# Patient Record
Sex: MALE | Race: WHITE | NOT HISPANIC OR LATINO | Employment: FULL TIME | URBAN - METROPOLITAN AREA
[De-identification: names, ages, dates, MRNs, and addresses within clinical notes are randomized per-mention and may not be internally consistent; named-entity substitution may affect disease eponyms.]

---

## 2017-10-05 ENCOUNTER — OFFICE VISIT (OUTPATIENT)
Dept: MEDICAL GROUP | Facility: MEDICAL CENTER | Age: 57
End: 2017-10-05
Payer: COMMERCIAL

## 2017-10-05 VITALS
TEMPERATURE: 98.3 F | WEIGHT: 218 LBS | HEIGHT: 71 IN | HEART RATE: 95 BPM | SYSTOLIC BLOOD PRESSURE: 120 MMHG | DIASTOLIC BLOOD PRESSURE: 88 MMHG | OXYGEN SATURATION: 95 % | BODY MASS INDEX: 30.52 KG/M2 | RESPIRATION RATE: 16 BRPM

## 2017-10-05 DIAGNOSIS — Z23 NEED FOR IMMUNIZATION AGAINST INFLUENZA: ICD-10-CM

## 2017-10-05 DIAGNOSIS — J30.1 CHRONIC SEASONAL ALLERGIC RHINITIS DUE TO POLLEN: ICD-10-CM

## 2017-10-05 DIAGNOSIS — E78.00 PURE HYPERCHOLESTEROLEMIA: ICD-10-CM

## 2017-10-05 DIAGNOSIS — R73.01 ELEVATED FASTING BLOOD SUGAR: ICD-10-CM

## 2017-10-05 DIAGNOSIS — K64.9 HEMORRHOIDS, UNSPECIFIED HEMORRHOID TYPE: ICD-10-CM

## 2017-10-05 DIAGNOSIS — Z00.00 ANNUAL PHYSICAL EXAM: ICD-10-CM

## 2017-10-05 DIAGNOSIS — E66.9 OBESITY (BMI 30-39.9): ICD-10-CM

## 2017-10-05 DIAGNOSIS — F41.8 SITUATIONAL ANXIETY: ICD-10-CM

## 2017-10-05 DIAGNOSIS — D69.6 THROMBOCYTOPENIA (HCC): ICD-10-CM

## 2017-10-05 PROCEDURE — 90686 IIV4 VACC NO PRSV 0.5 ML IM: CPT | Performed by: FAMILY MEDICINE

## 2017-10-05 PROCEDURE — 99999 PR NO CHARGE: CPT | Performed by: FAMILY MEDICINE

## 2017-10-05 PROCEDURE — 99386 PREV VISIT NEW AGE 40-64: CPT | Mod: 25 | Performed by: FAMILY MEDICINE

## 2017-10-05 PROCEDURE — 90471 IMMUNIZATION ADMIN: CPT | Performed by: FAMILY MEDICINE

## 2017-10-05 RX ORDER — LORAZEPAM 0.5 MG/1
TABLET ORAL
Qty: 20 TAB | Refills: 1 | Status: SHIPPED
Start: 2017-10-05 | End: 2018-04-14 | Stop reason: SDUPTHER

## 2017-10-05 RX ORDER — IBUPROFEN 200 MG
200 TABLET ORAL EVERY 6 HOURS PRN
COMMUNITY
End: 2018-05-10

## 2017-10-05 ASSESSMENT — PATIENT HEALTH QUESTIONNAIRE - PHQ9: CLINICAL INTERPRETATION OF PHQ2 SCORE: 0

## 2017-10-05 NOTE — LETTER
[facility]  Jose Maria Otero M.D.  [address]  [place] [address]  Fax: [phone]   Authorization for Release/Disclosure of   Protected Health Information   Name: MARVIN PLEITEZ : 1960 SSN: [id number]   Address: [address] [place] [address]  [place] [address] Phone:    [phone] (home) [phone] (work)   I authorize the entity listed below to release/disclose the PHI below to:   [facility]/Jose Maria Otero M.D. and Jose Maria Otero M.D.   Provider or Entity Name:  [facility], [facility]  [address] [facility] Phone:  [phone]    Fax:     Reason for request: continuity of care   Information to be released:    [  ] LAST COLONOSCOPY,  including any PATH REPORT and follow-up  [  ] LAST FIT/COLOGUARD RESULT [  ] LAST DEXA  [  ] LAST MAMMOGRAM  [  ] LAST PAP  [  ] LAST LABS [  ] RETINA EXAM REPORT  [  ] IMMUNIZATION RECORDS  [  ] Release all info      [  ] Check here and initial the line next to each item to release ALL health information INCLUDING  _____ Care and treatment for drug and / or alcohol abuse  _____ HIV testing, infection status, or AIDS  _____ Genetic Testing    DATES OF SERVICE OR TIME PERIOD TO BE DISCLOSED: _____________  I understand and acknowledge that:  * This Authorization may be revoked at any time by you in writing, except if your health information has already been used or disclosed.  * Your health information that will be used or disclosed as a result of you signing this authorization could be re-disclosed by the recipient. If this occurs, your re-disclosed health information may no longer be protected by State or Federal laws.  * You may refuse to sign this Authorization. Your refusal will not affect your ability to obtain treatment.  * This Authorization becomes effective upon signing and will  on (date) __________.      If no date is indicated, this Authorization will  one (1) year from the signature date.    Name:  Edith Love    Signature:   Date:     10/5/2017       PLEASE FAX REQUESTED RECORDS BACK TO: [phone]

## 2017-10-06 NOTE — PROGRESS NOTES
"Edith Love is a 57 y.o. male here for annual  HPI: 57-year-old white male who works as an  at the [employer]. His son is a medical student in [place].    He has no serious complaints other than he occasionally suffers with hemorrhoids that he treats with over-the-counter creams. In about once every 2 months he uses a lorazepam 0.5 mg tablet before a big speech or presentation. He states that he does not use a lot of perspiration and nervousness during the talk. His last prescription of lorazepam 20 tablets was over 3 years ago.    Current medicines (including changes today)  Current Outpatient Prescriptions   Medication Sig Dispense Refill   • ibuprofen (ADVIL) 200 MG Tab Take 200 mg by mouth every 6 hours as needed.     • lorazepam (ATIVAN) 0.5 MG Tab Take 30-60 before meeting one daily prn 20 Tab 1     No current facility-administered medications for this visit.      He  has no past medical history on file.  He  has no past surgical history on file.  Social History   Substance Use Topics   • Smoking status: Former Smoker     Years: 10.00   • Smokeless tobacco: Never Used      Comment: quit smoking in ; about 10 cigs daily    • Alcohol use Not on file     Social History     Social History Narrative   • No narrative on file     Family History   Problem Relation Age of Onset   • Stroke Father      Family Status   Relation Status   • Father    • Mother Alive   • Sister Alive   • Daughter Alive   • Son Alive    in [place]          ROS  No chest pain  All other systems reviewed and are negative     Objective:     Blood pressure 120/88, pulse 95, temperature 36.8 °C (98.3 °F), resp. rate 16, height 1.803 m (5' 10.98\"), weight 98.9 kg (218 lb), SpO2 95 %. Body mass index is 30.42 kg/m².  Physical Exam:    Constitutional: Alert, no distress.  Skin: Warm, dry, good turgor, no rashes in visible areas.  Eye: Equal, round and reactive, conjunctiva clear, lids normal.  ENMT: Lips without lesions, good " dentition, oropharynx clear.  Neck: Trachea midline, no masses, no thyromegaly. No cervical or supraclavicular lymphadenopathy.  Respiratory: Unlabored respiratory effort, lungs clear to auscultation, no wheezes, no ronchi.  Cardiovascular: Normal S1, S2, no murmur, no edema.  Abdomen: Soft, non-tender, no masses, no hepatosplenomegaly.  Psych: Alert and oriented x3, normal affect and mood.        Assessment and Plan:   The following treatment plan was discussed    1. Annual physical exam  Advised diet and exercise. Check labs and call with results.  - PROSTATE SPECIFIC AG SCREENING; Future  - COMP METABOLIC PANEL; Future  - CBC WITH DIFFERENTIAL; Future  - LIPID PROFILE; Future  - TSH WITH REFLEX TO FT4; Future  - HEMOGLOBIN A1C; Future    2. Need for immunization against influenza  - INFLUENZA VACCINE QUAD INJ >3Y(PF)    3. Obesity (BMI 30-39.9)  - Patient identified as having weight management issue.  Appropriate orders and counseling given.    4. Pure hypercholesterolemia  Advised diet and exercise. Check labs and call with results.    5. Elevated fasting blood sugar  Advised diet and exercise. Check labs and call with results.    6. Thrombocytopenia (CMS-HCC)  Check labs and call with results.    7. Chronic seasonal allergic rhinitis due to pollen  Stable. Continue to monitor.    8. Situational anxiety  Refill given today for lorazepam.  - lorazepam (ATIVAN) 0.5 MG Tab; Take 30-60 before meeting one daily prn  Dispense: 20 Tab; Refill: 1    9. Hemorrhoids, unspecified hemorrhoid type  Advised patient and if he is not able to control symptoms, we should refer him to gastroenterology.      Records requested from [facility] for colonoscopy records.  Followup: Return in about 1 year (around 10/5/2018) for Annual, Short.

## 2017-11-30 ENCOUNTER — OFFICE VISIT (OUTPATIENT)
Dept: URGENT CARE | Facility: CLINIC | Age: 57
End: 2017-11-30
Payer: COMMERCIAL

## 2017-11-30 VITALS
OXYGEN SATURATION: 95 % | DIASTOLIC BLOOD PRESSURE: 80 MMHG | HEIGHT: 71 IN | HEART RATE: 92 BPM | RESPIRATION RATE: 20 BRPM | WEIGHT: 215 LBS | SYSTOLIC BLOOD PRESSURE: 120 MMHG | BODY MASS INDEX: 30.1 KG/M2 | TEMPERATURE: 98.3 F

## 2017-11-30 DIAGNOSIS — J10.1 INFLUENZA A: ICD-10-CM

## 2017-11-30 LAB
FLUAV+FLUBV AG SPEC QL IA: NORMAL
INT CON NEG: NORMAL
INT CON POS: NORMAL

## 2017-11-30 PROCEDURE — 99213 OFFICE O/P EST LOW 20 MIN: CPT | Performed by: FAMILY MEDICINE

## 2017-11-30 PROCEDURE — 87804 INFLUENZA ASSAY W/OPTIC: CPT | Performed by: FAMILY MEDICINE

## 2017-11-30 RX ORDER — OSELTAMIVIR PHOSPHATE 75 MG/1
75 CAPSULE ORAL 2 TIMES DAILY
Qty: 10 CAP | Refills: 0 | Status: SHIPPED | OUTPATIENT
Start: 2017-11-30 | End: 2017-12-05

## 2017-11-30 NOTE — PROGRESS NOTES
"HPI: Edith Love is a 57 y.o. male who presents with   Chief Complaint   Patient presents with   • Cough     Couple wks dry cough , stuffy nose , couple days fever and headache       Patient has had several weeks of a dry cough and nasal congestion but over hte past 24 hours he's had bodyaches fevers chills increased F he knows. He did receive a flu vaccination approximately 6 weeks ago. He is a professor. Denies any nausea vomiting or diarrhea.  Worsened by: activity, laying supine at night, first thing in the morning, when exposed to outside allergens  Improved by: OTC symptomatic medictions      PMH:  has no past medical history on file.  MEDS:   Current Outpatient Prescriptions:   •  ibuprofen (ADVIL) 200 MG Tab, Take 200 mg by mouth every 6 hours as needed., Disp: , Rfl:   •  lorazepam (ATIVAN) 0.5 MG Tab, Take 30-60 before meeting one daily prn, Disp: 20 Tab, Rfl: 1  ALLERGIES:   Allergies   Allergen Reactions   • Nkda [No Known Drug Allergy]    SURGHX: No past surgical history on file.  SOCHX:  reports that he has quit smoking. He quit after 10.00 years of use. He has never used smokeless tobacco. He reports that he does not use drugs.  FH: Family history was reviewed, no pertinent findings to report    PE:  Vitals /80   Pulse 92   Temp 36.8 °C (98.3 °F)   Resp 20   Ht 1.803 m (5' 11\")   Wt 97.5 kg (215 lb)   SpO2 95%   BMI 29.99 kg/m²    Gen AOx4, NAD  HEENT: moist mucus membranes,Nasal congestion is present no pain or pressure with percussion of frontal, maxillary or ethmoid sinuses.  Bilateral conjunciva clear without erythema or exudate,  Bilateral TM's clear without bulge, fluid or loss of landmarks, no pharyngeal erythema or tonsillar exudate or tonsillar enlargement  Neck: supple, no cervical lymphadenopathy, no signs of menigismus  CV/PULM: RRR no murmurs, no rales ronchi or wheezes, no signs of resp distress  Abd soft nontender, bs present  Skin no rashes  Extremities -c/c/e  Neuro " appropriate affect,     A/P  1. Influenza A  POCT Influenza A/B    Hydrocod Polst-CPM Polst ER (TUSSIONEX) 10-8 MG/5ML Suspension Extended Release    oseltamivir (TAMIFLU) 75 MG Cap     Follow-up with primary care provider within 4-5 days, emergency room precautions discussed.  Patient and/or family appears understanding of information.

## 2018-02-20 ENCOUNTER — APPOINTMENT (OUTPATIENT)
Dept: RADIOLOGY | Facility: MEDICAL CENTER | Age: 58
End: 2018-02-20
Attending: NURSE PRACTITIONER
Payer: COMMERCIAL

## 2018-02-20 ENCOUNTER — TELEPHONE (OUTPATIENT)
Dept: MEDICAL GROUP | Facility: MEDICAL CENTER | Age: 58
End: 2018-02-20

## 2018-02-20 ENCOUNTER — APPOINTMENT (OUTPATIENT)
Dept: RADIOLOGY | Facility: IMAGING CENTER | Age: 58
End: 2018-02-20
Attending: NURSE PRACTITIONER
Payer: COMMERCIAL

## 2018-02-20 ENCOUNTER — OFFICE VISIT (OUTPATIENT)
Dept: URGENT CARE | Facility: CLINIC | Age: 58
End: 2018-02-20
Payer: COMMERCIAL

## 2018-02-20 VITALS
DIASTOLIC BLOOD PRESSURE: 92 MMHG | WEIGHT: 218 LBS | HEIGHT: 71 IN | TEMPERATURE: 98 F | SYSTOLIC BLOOD PRESSURE: 140 MMHG | BODY MASS INDEX: 30.52 KG/M2 | OXYGEN SATURATION: 96 % | HEART RATE: 100 BPM | RESPIRATION RATE: 22 BRPM

## 2018-02-20 DIAGNOSIS — M79.605 LEFT LEG PAIN: ICD-10-CM

## 2018-02-20 DIAGNOSIS — M54.42 ACUTE MIDLINE LOW BACK PAIN WITH LEFT-SIDED SCIATICA: ICD-10-CM

## 2018-02-20 PROCEDURE — 99214 OFFICE O/P EST MOD 30 MIN: CPT | Performed by: NURSE PRACTITIONER

## 2018-02-20 PROCEDURE — 72100 X-RAY EXAM L-S SPINE 2/3 VWS: CPT | Mod: TC,FY | Performed by: NURSE PRACTITIONER

## 2018-02-20 PROCEDURE — 72148 MRI LUMBAR SPINE W/O DYE: CPT

## 2018-02-20 RX ORDER — PREDNISONE 20 MG/1
TABLET ORAL
Qty: 27 TAB | Refills: 0 | Status: SHIPPED | OUTPATIENT
Start: 2018-02-20 | End: 2018-05-10

## 2018-02-20 RX ORDER — HYDROCODONE BITARTRATE AND ACETAMINOPHEN 5; 325 MG/1; MG/1
1-2 TABLET ORAL EVERY 6 HOURS PRN
Qty: 20 TAB | Refills: 0 | Status: SHIPPED | OUTPATIENT
Start: 2018-02-20 | End: 2018-02-27

## 2018-02-20 RX ORDER — METHYLPREDNISOLONE SODIUM SUCCINATE 125 MG/2ML
125 INJECTION, POWDER, LYOPHILIZED, FOR SOLUTION INTRAMUSCULAR; INTRAVENOUS ONCE
Status: COMPLETED | OUTPATIENT
Start: 2018-02-20 | End: 2018-02-20

## 2018-02-20 RX ADMIN — METHYLPREDNISOLONE SODIUM SUCCINATE 125 MG: 125 INJECTION, POWDER, LYOPHILIZED, FOR SOLUTION INTRAMUSCULAR; INTRAVENOUS at 12:48

## 2018-02-20 ASSESSMENT — ENCOUNTER SYMPTOMS
CHILLS: 0
BACK PAIN: 1
FEVER: 0

## 2018-02-20 NOTE — TELEPHONE ENCOUNTER
1. Caller Name: Edith Love                                         Call Back Number: [phone]      Patient approves a detailed voicemail message: yes    Pt left a message on  reporting he was having severe back pain and went to . Pt did have an xray and MRI. Pt was referred to a Spine Doctor and wanted you to be aware of what was going on.

## 2018-02-20 NOTE — PROGRESS NOTES
"Subjective:      Edith Love is a 57 y.o. male who presents with Back Pain (Almost 8 wks lower back pain radiated to left leg comes and goes , x 3 days wrosen .)    History reviewed. No pertinent past medical history.  Social History     Social History   • Marital status:      Spouse name: N/A   • Number of children: N/A   • Years of education: N/A     Occupational History   • Not on file.     Social History Main Topics   • Smoking status: Former Smoker     Years: 10.00     Quit date: 2/20/2010   • Smokeless tobacco: Never Used      Comment: quit smoking in 1993; about 10 cigs daily    • Alcohol use Not on file   • Drug use: No   • Sexual activity: Yes     Partners: Female     Other Topics Concern   • Not on file     Social History Narrative   • No narrative on file     Family History   Problem Relation Age of Onset   • Stroke Father        Allergies: Nkda [no known drug allergy]              Back Pain   This is a new problem. The current episode started 1 to 4 weeks ago. The problem occurs constantly. The problem has been gradually worsening since onset. Pain location: lower back. The pain radiates to the left thigh. The pain is at a severity of 4/10. The pain is moderate. The symptoms are aggravated by bending, position, standing and twisting. Stiffness is present all day. Pertinent negatives include no fever. He has tried nothing for the symptoms. The treatment provided no relief.       Review of Systems   Constitutional: Negative for chills, fever and malaise/fatigue.   Musculoskeletal: Positive for back pain.   All other systems reviewed and are negative.         Objective:     /92   Pulse 100   Temp 36.7 °C (98 °F)   Resp (!) 22   Ht 1.803 m (5' 11\")   Wt 98.9 kg (218 lb)   SpO2 96%   BMI 30.40 kg/m²      Physical Exam   Constitutional: He appears well-developed and well-nourished. No distress.   Musculoskeletal:        Lumbar back: He exhibits decreased range of motion, tenderness, pain " and spasm.        Back:    Skin: Skin is warm and dry. Capillary refill takes less than 2 seconds. He is not diaphoretic.   Psychiatric: He has a normal mood and affect. His behavior is normal. Judgment and thought content normal.   Vitals reviewed.    2/20/2018 12:09 PM    HISTORY/REASON FOR EXAM:  Atraumatic Pain  Lower back pain for months radiating down the left leg    TECHNIQUE/ EXAM DESCRIPTION AND NUMBER OF VIEWS:  3 views of the lumbar spine.    COMPARISON: None.    FINDINGS:  There is mild anterolisthesis of L5 on S1.  Small osteophytes project from the endplates of the lumbar spine.  Disc spaces are fairly well-maintained. There is facet arthropathy in the lower lumbar spine.   Impression         1.  No acute findings.    2.  Minimal degenerative change without significant disc space narrowing.    3.  Facet arthropathy in the lower lumbar spine.    4.  Mild anterolisthesis at L5-S1.               Assessment/Plan:     1. Acute midline low back pain with left-sided sciatica  XR lumbar spine- scheduled for today  Norco PRN pain; clearly stated no driving or ETOH with this medication. Checked patient's  and find no evidence of narcotic misuse. ORT score 0. Consent signed.  amb ref [facility] [place]  Solumedrol 125 mg IM  Prednisone  Strict ER precautions for worsening of symptoms

## 2018-04-14 DIAGNOSIS — F41.8 SITUATIONAL ANXIETY: ICD-10-CM

## 2018-04-16 ENCOUNTER — TELEPHONE (OUTPATIENT)
Dept: MEDICAL GROUP | Facility: MEDICAL CENTER | Age: 58
End: 2018-04-16

## 2018-04-16 RX ORDER — LORAZEPAM 0.5 MG/1
TABLET ORAL
Qty: 20 TAB | Refills: 0 | Status: SHIPPED
Start: 2018-04-16 | End: 2018-05-10

## 2018-04-16 NOTE — TELEPHONE ENCOUNTER
[facility] called asking for day supply on patient's rx for lorazepam. Informed pharmacy end date was 5/16/18. No further action needed.

## 2018-05-10 ENCOUNTER — HOSPITAL ENCOUNTER (OUTPATIENT)
Dept: RADIOLOGY | Facility: MEDICAL CENTER | Age: 58
End: 2018-05-10
Attending: NEUROLOGICAL SURGERY | Admitting: NEUROLOGICAL SURGERY
Payer: COMMERCIAL

## 2018-05-10 ENCOUNTER — APPOINTMENT (OUTPATIENT)
Dept: RADIOLOGY | Facility: MEDICAL CENTER | Age: 58
End: 2018-05-10
Attending: NEUROLOGICAL SURGERY
Payer: COMMERCIAL

## 2018-05-10 DIAGNOSIS — Z01.811 PRE-OPERATIVE RESPIRATORY EXAMINATION: ICD-10-CM

## 2018-05-10 DIAGNOSIS — Z01.812 PRE-OPERATIVE LABORATORY EXAMINATION: ICD-10-CM

## 2018-05-10 DIAGNOSIS — Z01.810 PRE-OPERATIVE CARDIOVASCULAR EXAMINATION: ICD-10-CM

## 2018-05-10 LAB
ANION GAP SERPL CALC-SCNC: 7 MMOL/L (ref 0–11.9)
APPEARANCE UR: CLEAR
APTT PPP: 27.8 SEC (ref 24.7–36)
BASOPHILS # BLD AUTO: 1 % (ref 0–1.8)
BASOPHILS # BLD: 0.06 K/UL (ref 0–0.12)
BILIRUB UR QL STRIP.AUTO: NEGATIVE
BUN SERPL-MCNC: 16 MG/DL (ref 8–22)
CALCIUM SERPL-MCNC: 10.1 MG/DL (ref 8.5–10.5)
CHLORIDE SERPL-SCNC: 103 MMOL/L (ref 96–112)
CO2 SERPL-SCNC: 28 MMOL/L (ref 20–33)
COLOR UR: YELLOW
CREAT SERPL-MCNC: 0.95 MG/DL (ref 0.5–1.4)
EKG IMPRESSION: NORMAL
EOSINOPHIL # BLD AUTO: 0.14 K/UL (ref 0–0.51)
EOSINOPHIL NFR BLD: 2.3 % (ref 0–6.9)
ERYTHROCYTE [DISTWIDTH] IN BLOOD BY AUTOMATED COUNT: 43 FL (ref 35.9–50)
GLUCOSE SERPL-MCNC: 105 MG/DL (ref 65–99)
GLUCOSE UR STRIP.AUTO-MCNC: NEGATIVE MG/DL
HCT VFR BLD AUTO: 50 % (ref 42–52)
HGB BLD-MCNC: 16.6 G/DL (ref 14–18)
IMM GRANULOCYTES # BLD AUTO: 0.02 K/UL (ref 0–0.11)
IMM GRANULOCYTES NFR BLD AUTO: 0.3 % (ref 0–0.9)
INR PPP: 1 (ref 0.87–1.13)
KETONES UR STRIP.AUTO-MCNC: NEGATIVE MG/DL
LEUKOCYTE ESTERASE UR QL STRIP.AUTO: NEGATIVE
LYMPHOCYTES # BLD AUTO: 2 K/UL (ref 1–4.8)
LYMPHOCYTES NFR BLD: 33.3 % (ref 22–41)
MCH RBC QN AUTO: 30.3 PG (ref 27–33)
MCHC RBC AUTO-ENTMCNC: 33.2 G/DL (ref 33.7–35.3)
MCV RBC AUTO: 91.4 FL (ref 81.4–97.8)
MICRO URNS: NORMAL
MONOCYTES # BLD AUTO: 0.53 K/UL (ref 0–0.85)
MONOCYTES NFR BLD AUTO: 8.8 % (ref 0–13.4)
NEUTROPHILS # BLD AUTO: 3.25 K/UL (ref 1.82–7.42)
NEUTROPHILS NFR BLD: 54.3 % (ref 44–72)
NITRITE UR QL STRIP.AUTO: NEGATIVE
NRBC # BLD AUTO: 0 K/UL
NRBC BLD-RTO: 0 /100 WBC
PH UR STRIP.AUTO: 7 [PH]
PLATELET # BLD AUTO: 158 K/UL (ref 164–446)
PMV BLD AUTO: 12.7 FL (ref 9–12.9)
POTASSIUM SERPL-SCNC: 4.3 MMOL/L (ref 3.6–5.5)
PROT UR QL STRIP: NEGATIVE MG/DL
PROTHROMBIN TIME: 12.9 SEC (ref 12–14.6)
RBC # BLD AUTO: 5.47 M/UL (ref 4.7–6.1)
RBC UR QL AUTO: NEGATIVE
SODIUM SERPL-SCNC: 138 MMOL/L (ref 135–145)
SP GR UR STRIP.AUTO: 1.02
UROBILINOGEN UR STRIP.AUTO-MCNC: 0.2 MG/DL
WBC # BLD AUTO: 6 K/UL (ref 4.8–10.8)

## 2018-05-10 PROCEDURE — 80048 BASIC METABOLIC PNL TOTAL CA: CPT

## 2018-05-10 PROCEDURE — 93010 ELECTROCARDIOGRAM REPORT: CPT | Performed by: INTERNAL MEDICINE

## 2018-05-10 PROCEDURE — 85610 PROTHROMBIN TIME: CPT

## 2018-05-10 PROCEDURE — 81003 URINALYSIS AUTO W/O SCOPE: CPT

## 2018-05-10 PROCEDURE — 71045 X-RAY EXAM CHEST 1 VIEW: CPT

## 2018-05-10 PROCEDURE — 85730 THROMBOPLASTIN TIME PARTIAL: CPT

## 2018-05-10 PROCEDURE — 93005 ELECTROCARDIOGRAM TRACING: CPT

## 2018-05-10 PROCEDURE — 85025 COMPLETE CBC W/AUTO DIFF WBC: CPT

## 2018-05-10 PROCEDURE — 36415 COLL VENOUS BLD VENIPUNCTURE: CPT

## 2018-05-15 ENCOUNTER — APPOINTMENT (OUTPATIENT)
Dept: RADIOLOGY | Facility: MEDICAL CENTER | Age: 58
End: 2018-05-15
Attending: NEUROLOGICAL SURGERY
Payer: COMMERCIAL

## 2018-05-15 ENCOUNTER — HOSPITAL ENCOUNTER (OUTPATIENT)
Facility: MEDICAL CENTER | Age: 58
End: 2018-05-15
Attending: NEUROLOGICAL SURGERY | Admitting: NEUROLOGICAL SURGERY
Payer: COMMERCIAL

## 2018-05-15 VITALS
SYSTOLIC BLOOD PRESSURE: 130 MMHG | WEIGHT: 220.68 LBS | HEART RATE: 80 BPM | DIASTOLIC BLOOD PRESSURE: 88 MMHG | RESPIRATION RATE: 16 BRPM | OXYGEN SATURATION: 99 % | TEMPERATURE: 98.3 F | BODY MASS INDEX: 29.89 KG/M2 | HEIGHT: 72 IN

## 2018-05-15 DIAGNOSIS — Z98.890 S/P SPINAL SURGERY: ICD-10-CM

## 2018-05-15 PROCEDURE — 700111 HCHG RX REV CODE 636 W/ 250 OVERRIDE (IP)

## 2018-05-15 PROCEDURE — A9270 NON-COVERED ITEM OR SERVICE: HCPCS | Performed by: PHYSICIAN ASSISTANT

## 2018-05-15 PROCEDURE — 700102 HCHG RX REV CODE 250 W/ 637 OVERRIDE(OP)

## 2018-05-15 PROCEDURE — 110454 HCHG SHELL REV 250: Performed by: NEUROLOGICAL SURGERY

## 2018-05-15 PROCEDURE — 700101 HCHG RX REV CODE 250

## 2018-05-15 PROCEDURE — A9270 NON-COVERED ITEM OR SERVICE: HCPCS

## 2018-05-15 PROCEDURE — 501838 HCHG SUTURE GENERAL: Performed by: NEUROLOGICAL SURGERY

## 2018-05-15 PROCEDURE — 160009 HCHG ANES TIME/MIN: Performed by: NEUROLOGICAL SURGERY

## 2018-05-15 PROCEDURE — 95925 SOMATOSENSORY TESTING: CPT | Performed by: NEUROLOGICAL SURGERY

## 2018-05-15 PROCEDURE — 160035 HCHG PACU - 1ST 60 MINS PHASE I: Performed by: NEUROLOGICAL SURGERY

## 2018-05-15 PROCEDURE — 95861 NEEDLE EMG 2 EXTREMITIES: CPT | Performed by: NEUROLOGICAL SURGERY

## 2018-05-15 PROCEDURE — 95940 IONM IN OPERATNG ROOM 15 MIN: CPT | Performed by: NEUROLOGICAL SURGERY

## 2018-05-15 PROCEDURE — 160036 HCHG PACU - EA ADDL 30 MINS PHASE I: Performed by: NEUROLOGICAL SURGERY

## 2018-05-15 PROCEDURE — 160048 HCHG OR STATISTICAL LEVEL 1-5: Performed by: NEUROLOGICAL SURGERY

## 2018-05-15 PROCEDURE — 700102 HCHG RX REV CODE 250 W/ 637 OVERRIDE(OP): Performed by: PHYSICIAN ASSISTANT

## 2018-05-15 PROCEDURE — 160002 HCHG RECOVERY MINUTES (STAT): Performed by: NEUROLOGICAL SURGERY

## 2018-05-15 PROCEDURE — 160041 HCHG SURGERY MINUTES - EA ADDL 1 MIN LEVEL 4: Performed by: NEUROLOGICAL SURGERY

## 2018-05-15 PROCEDURE — 95937 NEUROMUSCULAR JUNCTION TEST: CPT | Performed by: NEUROLOGICAL SURGERY

## 2018-05-15 PROCEDURE — 160029 HCHG SURGERY MINUTES - 1ST 30 MINS LEVEL 4: Performed by: NEUROLOGICAL SURGERY

## 2018-05-15 PROCEDURE — 500885 HCHG PACK, JACKSON TABLE: Performed by: NEUROLOGICAL SURGERY

## 2018-05-15 PROCEDURE — 160025 RECOVERY II MINUTES (STATS): Performed by: NEUROLOGICAL SURGERY

## 2018-05-15 PROCEDURE — 72020 X-RAY EXAM OF SPINE 1 VIEW: CPT

## 2018-05-15 PROCEDURE — 160047 HCHG PACU  - EA ADDL 30 MINS PHASE II: Performed by: NEUROLOGICAL SURGERY

## 2018-05-15 PROCEDURE — 160046 HCHG PACU - 1ST 60 MINS PHASE II: Performed by: NEUROLOGICAL SURGERY

## 2018-05-15 RX ORDER — OXYCODONE HYDROCHLORIDE 5 MG/1
5-10 TABLET ORAL EVERY 6 HOURS PRN
Qty: 100 TAB | Refills: 0 | Status: SHIPPED | OUTPATIENT
Start: 2018-05-15 | End: 2018-05-29

## 2018-05-15 RX ORDER — SODIUM CHLORIDE, SODIUM LACTATE, POTASSIUM CHLORIDE, CALCIUM CHLORIDE 600; 310; 30; 20 MG/100ML; MG/100ML; MG/100ML; MG/100ML
INJECTION, SOLUTION INTRAVENOUS CONTINUOUS
Status: DISCONTINUED | OUTPATIENT
Start: 2018-05-15 | End: 2018-05-15 | Stop reason: HOSPADM

## 2018-05-15 RX ORDER — CEPHALEXIN 500 MG/1
500 CAPSULE ORAL 4 TIMES DAILY
Qty: 20 CAP | Refills: 0 | Status: SHIPPED | OUTPATIENT
Start: 2018-05-15

## 2018-05-15 RX ORDER — ACETAMINOPHEN 500 MG
TABLET ORAL
Status: DISCONTINUED
Start: 2018-05-15 | End: 2018-05-15 | Stop reason: HOSPADM

## 2018-05-15 RX ORDER — ONDANSETRON 2 MG/ML
4 INJECTION INTRAMUSCULAR; INTRAVENOUS EVERY 4 HOURS PRN
Status: DISCONTINUED | OUTPATIENT
Start: 2018-05-15 | End: 2018-05-15 | Stop reason: HOSPADM

## 2018-05-15 RX ORDER — MIDAZOLAM HYDROCHLORIDE 1 MG/ML
INJECTION INTRAMUSCULAR; INTRAVENOUS
Status: DISCONTINUED
Start: 2018-05-15 | End: 2018-05-15 | Stop reason: HOSPADM

## 2018-05-15 RX ORDER — CYCLOBENZAPRINE HCL 10 MG
10 TABLET ORAL 3 TIMES DAILY PRN
Qty: 40 TAB | Refills: 0 | Status: SHIPPED | OUTPATIENT
Start: 2018-05-15 | End: 2018-05-15

## 2018-05-15 RX ORDER — OXYCODONE HYDROCHLORIDE 10 MG/1
5 TABLET ORAL
Status: DISCONTINUED | OUTPATIENT
Start: 2018-05-15 | End: 2018-05-15 | Stop reason: HOSPADM

## 2018-05-15 RX ORDER — DIPHENHYDRAMINE HYDROCHLORIDE 50 MG/ML
25 INJECTION INTRAMUSCULAR; INTRAVENOUS EVERY 6 HOURS PRN
Status: DISCONTINUED | OUTPATIENT
Start: 2018-05-15 | End: 2018-05-15 | Stop reason: HOSPADM

## 2018-05-15 RX ORDER — LIDOCAINE HYDROCHLORIDE 10 MG/ML
0.5 INJECTION, SOLUTION INFILTRATION; PERINEURAL
Status: DISCONTINUED | OUTPATIENT
Start: 2018-05-15 | End: 2018-05-15 | Stop reason: HOSPADM

## 2018-05-15 RX ORDER — MORPHINE SULFATE 10 MG/ML
2-4 INJECTION, SOLUTION INTRAMUSCULAR; INTRAVENOUS
Status: DISCONTINUED | OUTPATIENT
Start: 2018-05-15 | End: 2018-05-15 | Stop reason: HOSPADM

## 2018-05-15 RX ORDER — BUPIVACAINE HYDROCHLORIDE AND EPINEPHRINE 5; 5 MG/ML; UG/ML
INJECTION, SOLUTION EPIDURAL; INTRACAUDAL; PERINEURAL
Status: DISCONTINUED | OUTPATIENT
Start: 2018-05-15 | End: 2018-05-15 | Stop reason: HOSPADM

## 2018-05-15 RX ORDER — HYDROMORPHONE HYDROCHLORIDE 2 MG/ML
INJECTION, SOLUTION INTRAMUSCULAR; INTRAVENOUS; SUBCUTANEOUS
Status: DISCONTINUED
Start: 2018-05-15 | End: 2018-05-15 | Stop reason: HOSPADM

## 2018-05-15 RX ORDER — CEPHALEXIN 500 MG/1
500 CAPSULE ORAL 4 TIMES DAILY
Qty: 20 CAP | Refills: 0 | Status: SHIPPED | OUTPATIENT
Start: 2018-05-15 | End: 2018-05-15

## 2018-05-15 RX ORDER — BACITRACIN 50000 [IU]/1
INJECTION, POWDER, FOR SOLUTION INTRAMUSCULAR
Status: DISCONTINUED | OUTPATIENT
Start: 2018-05-15 | End: 2018-05-15 | Stop reason: HOSPADM

## 2018-05-15 RX ORDER — OXYCODONE HYDROCHLORIDE 10 MG/1
10 TABLET ORAL
Status: DISCONTINUED | OUTPATIENT
Start: 2018-05-15 | End: 2018-05-15 | Stop reason: HOSPADM

## 2018-05-15 RX ORDER — OXYCODONE HCL 5 MG/5 ML
SOLUTION, ORAL ORAL
Status: COMPLETED
Start: 2018-05-15 | End: 2018-05-15

## 2018-05-15 RX ORDER — LIDOCAINE HYDROCHLORIDE 10 MG/ML
INJECTION, SOLUTION INFILTRATION; PERINEURAL
Status: COMPLETED
Start: 2018-05-15 | End: 2018-05-15

## 2018-05-15 RX ORDER — DEXAMETHASONE SODIUM PHOSPHATE 4 MG/ML
4 INJECTION, SOLUTION INTRA-ARTICULAR; INTRALESIONAL; INTRAMUSCULAR; INTRAVENOUS; SOFT TISSUE
Status: DISCONTINUED | OUTPATIENT
Start: 2018-05-15 | End: 2018-05-15 | Stop reason: HOSPADM

## 2018-05-15 RX ORDER — OXYCODONE HYDROCHLORIDE AND ACETAMINOPHEN 5; 325 MG/1; MG/1
1-2 TABLET ORAL EVERY 4 HOURS PRN
Status: ON HOLD | COMMUNITY
End: 2018-05-15

## 2018-05-15 RX ORDER — CYCLOBENZAPRINE HCL 10 MG
10 TABLET ORAL 3 TIMES DAILY PRN
Qty: 40 TAB | Refills: 0 | Status: SHIPPED | OUTPATIENT
Start: 2018-05-15

## 2018-05-15 RX ORDER — SODIUM CHLORIDE, SODIUM LACTATE, POTASSIUM CHLORIDE, AND CALCIUM CHLORIDE .6; .31; .03; .02 G/100ML; G/100ML; G/100ML; G/100ML
IRRIGANT IRRIGATION
Status: DISCONTINUED | OUTPATIENT
Start: 2018-05-15 | End: 2018-05-15 | Stop reason: HOSPADM

## 2018-05-15 RX ORDER — CELECOXIB 200 MG/1
CAPSULE ORAL
Status: DISCONTINUED
Start: 2018-05-15 | End: 2018-05-15 | Stop reason: HOSPADM

## 2018-05-15 RX ADMIN — SODIUM CHLORIDE, SODIUM LACTATE, POTASSIUM CHLORIDE, CALCIUM CHLORIDE: 600; 310; 30; 20 INJECTION, SOLUTION INTRAVENOUS at 07:45

## 2018-05-15 RX ADMIN — LIDOCAINE HYDROCHLORIDE 0.5 ML: 10 INJECTION, SOLUTION INFILTRATION; PERINEURAL at 07:45

## 2018-05-15 RX ADMIN — OXYCODONE HYDROCHLORIDE 5 MG: 5 SOLUTION ORAL at 12:47

## 2018-05-15 ASSESSMENT — PAIN SCALES - GENERAL
PAINLEVEL_OUTOF10: 0
PAINLEVEL_OUTOF10: 2
PAINLEVEL_OUTOF10: 0
PAINLEVEL_OUTOF10: 1
PAINLEVEL_OUTOF10: 0
PAINLEVEL_OUTOF10: 0

## 2018-05-15 NOTE — OR SURGEON
Immediate Post OP Note    PreOp Diagnosis: Left L5-S1 Far lateral HNP, Foraminal stenosis, Lumbar radiculopathy    PostOp Diagnosis: Same    Procedure(s):  LUMBAR LAMINECTOMY DISKECTOMY-  MINIMALLY INVASIVE L5-S1 MICRODISC VIA LEFT APPROACH - Wound Class: Clean    Surgeon(s):  Jacoby Montano M.D.    Anesthesiologist/Type of Anesthesia:  Anesthesiologist: Juan Miguel Olguin M.D./General    Surgical Staff:  Assistant: Eunice Hitchcock P.A.-C.  Circulator: Loreta Wolfe R.N.  Relief Scrub: Tammy Forrest  Scrub Person: Curly Gonsalves  Radiology Technologist: Alejandra Grande; Marilou Lima    Specimens removed if any:  * No specimens in log *    Estimated Blood Loss: 50ccs    Findings: See op report    Complications: None        5/15/2018 12:11 PM Eunice Hitchcock P.A.-C.

## 2018-05-15 NOTE — DISCHARGE INSTRUCTIONS
ACTIVITY: Rest and take it easy for the first 24 hours.  A responsible adult is recommended to remain with you during that time.  It is normal to feel sleepy.  We encourage you to not do anything that requires balance, judgment or coordination.    MILD FLU-LIKE SYMPTOMS ARE NORMAL. YOU MAY EXPERIENCE GENERALIZED MUSCLE ACHES, THROAT IRRITATION, HEADACHE AND/OR SOME NAUSEA.    FOR 24 HOURS DO NOT:  Drive, operate machinery or run household appliances.  Drink beer or alcoholic beverages.   Make important decisions or sign legal documents.    SPECIAL INSTRUCTIONS:   Return to ER with chest pain, shortness of breath, difficulty swallowing, leg or arm swelling.  Take pain medication as prescribed.  No driving.  NO NSAIDs X 10 days  Keep wound dry X 4 days  Ice to incision frequently  Stool softeners as needed  No bending/lifting/twisting greater than 10 pounds  Return on already scheduled post-operative appointment       DIET: To avoid nausea, slowly advance diet as tolerated, avoiding spicy or greasy foods for the first day.  Add more substantial food to your diet according to your physician's instructions.  Babies can be fed formula or breast milk as soon as they are hungry.  INCREASE FLUIDS AND FIBER TO AVOID CONSTIPATION.    SURGICAL DRESSING/BATHING: See above    FOLLOW-UP APPOINTMENT:  A follow-up appointment should be arranged with your doctor in 1-2 weeks; call to schedule.    You should CALL YOUR PHYSICIAN if you develop:  Fever greater than 101 degrees F.  Pain not relieved by medication, or persistent nausea or vomiting.  Excessive bleeding (blood soaking through dressing) or unexpected drainage from the wound.  Extreme redness or swelling around the incision site, drainage of pus or foul smelling drainage.  Inability to urinate or empty your bladder within 8 hours.  Problems with breathing or chest pain.    You should call 911 if you develop problems with breathing or chest pain.  If you are unable to  contact your doctor or surgical center, you should go to the nearest emergency room or urgent care center.  Physician's telephone #: Dr. Montano [phone]    If any questions arise, call your doctor.  If your doctor is not available, please feel free to call the Surgical Center at [phone].  The Center is open Monday through Friday from 7AM to 7PM.  You can also call the HEALTH HOTLINE open 24 hours/day, 7 days/week and speak to a nurse at [phone], or toll free at [phone].    A registered nurse may call you a few days after your surgery to see how you are doing after your procedure.    MEDICATIONS: Resume taking daily medication.  Take prescribed pain medication with food.  If no medication is prescribed, you may take non-aspirin pain medication if needed.  PAIN MEDICATION CAN BE VERY CONSTIPATING.  Take a stool softener or laxative such as senokot, pericolace, or milk of magnesia if needed.    Prescription given for Keflex, Flexeril, Oxycodone.  Last pain medication given at 12:47 pm.    If your physician has prescribed pain medication that includes Acetaminophen (Tylenol), do not take additional Acetaminophen (Tylenol) while taking the prescribed medication.    Depression / Suicide Risk    As you are discharged from this [facility] facility, it is important to learn how to keep safe from harming yourself.    Recognize the warning signs:  · Abrupt changes in personality, positive or negative- including increase in energy   · Giving away possessions  · Change in eating patterns- significant weight changes-  positive or negative  · Change in sleeping patterns- unable to sleep or sleeping all the time   · Unwillingness or inability to communicate  · Depression  · Unusual sadness, discouragement and loneliness  · Talk of wanting to die  · Neglect of personal appearance   · Rebelliousness- reckless behavior  · Withdrawal from people/activities they love  · Confusion- inability to  concentrate     If you or a loved one observes any of these behaviors or has concerns about self-harm, here's what you can do:  · Talk about it- your feelings and reasons for harming yourself  · Remove any means that you might use to hurt yourself (examples: pills, rope, extension cords, firearm)  · Get professional help from the community (Mental Health, Substance Abuse, psychological counseling)  · Do not be alone:Call your Safe Contact- someone whom you trust who will be there for you.  · Call your local CRISIS HOTLINE 270-1516 or 538-996-3695  · Call your local Children's Mobile Crisis Response Team [place] (565) 703-6170 or www.Veebox  · Call the toll free National Suicide Prevention Hotlines   · National Suicide Prevention Lifeline 805-362-SWIB (5205)  · National Hope Line Network 800-SUICIDE (531-2946)

## 2018-05-15 NOTE — OR NURSING
Pt on room air.  3000 on Incentive spirometer.  No c/o nausea, tolerating PO fluids. Back surgical dressing CDI.  No c/o pain.  VSS, afebrile.  A/O x4.

## 2018-05-29 NOTE — OP REPORT
DATE OF SERVICE:  05/15/2018    PREOPERATIVE DIAGNOSES:  1.  Left L5-S1 radiculopathies.  2.  Left L5-S1 foraminal stenosis.  3.  Left L5-S1 disk herniation.  4.  Failed conservative care including epidural injections.    POSTOPERATIVE DIAGNOSES:  1.  Left L5-S1 radiculopathies.  2.  Left L5-S1 foraminal stenosis.  3.  Left L5-S1 disk herniation.  4.  Failed conservative care including epidural injections.    PROCEDURE:  Minimally invasive approach with CENTRIC retractor system.  1.  Left L5 laminotomy and foraminotomy, decompression of left L5 nerve root.  2.  Left S1 laminotomy and foraminotomy, decompression of left S1 nerve root.  3.  Left L5 transpedicular approach far lateral decompression of left L5 nerve   root.  4.  Left S1 transpedicular approach far lateral decompression of left S1 nerve   root.  5.  Left L5-S1 microdiskectomy.  6.  Microscope for microdissection of spinal canal.  7.  SSEPs, EMG monitoring performed by neuromonitoring associates, which   remained stable throughout.    SURGEON:  Jacoby Montano MD, neurosurgery-spine surgery.    ASSISTANT:  Eunice Hitchcock PA-C    ANESTHESIA:  General endotracheal anesthesia.    ANESTHESIOLOGIST:  Juan Miguel Olguin MD    COMPLICATIONS:  None.    ESTIMATED BLOOD LOSS:  Less than 10 mL.    PREOPERATIVE NOTE:  This is an extremely pleasant 57-year-old male who   presents with left lower extremity radicular leg pain, weakness and   paresthesia consistent with left L5-S1 radiculopathies.  MRI showed collapse   of the disk space at L5-S1 with foraminal stenosis and superimposed large disk   herniation on the left side.  Patient failed extensive conservative care.    Given the patient's neurological deficits and MRI findings, I offered the   patient a minimally invasive left L5-S1 microdiskectomy.  I discussed surgical   procedure, alternatives, goals, risks benefits and complications in detail   with the patient.  I also used [facility] custom 3D  animations to also   assist the patient with his understanding of surgical procedure.  Patient   understood and consented to the surgery.    NARRATIVE DICTATION:  Patient was brought to the operating room and placed   under general endotracheal anesthesia.  He was placed prone on the operating   OSI table with care taken about the bony prominences, peripheral nerves.    Lumbar region was prepped and draped in usual sterile fashion.  Following   localization with cross table fluoroscopy, a small incision was made to the   left of midline over L5-S1.  The fascia was incised.  I used the serial   dilator tubes for muscle splitting approach and docked on the left L5 pars.    The serial dilators were placed and then the CENTRIC retractor system was   placed over the blades opened up allowing excellent exposure at L5-S1 level.    Intraoperative x-ray confirmed appropriate levels.  Once excellent exposure   was achieved, I used a Chelsieas Dioni AM-8 drillbit, Kerrison rongeurs and curettes   to perform left L5 laminotomy and foraminotomy and left S1 laminotomy and   foraminotomy.  Medial facet was drilled down.  Marked facet and ligamentous   hypertrophy was undercut and removed.  The microscope was brought in surgical   field for microdissection of spinal canal.  Under microscope, wider   foraminotomy was completed over the left L5-S1 nerve root complexes.  I   drilled down the left L5 pedicle and the left S1 pedicle for transpedicular   decompression of left L5-S1 nerve root complexes to adequately decompress the   spinal canal widely and over the foramen.  This required extra degree of   expertise, effort and time.  Hence, a modifier-59 is required for the extra   degree of expertise, effort and time with the separate and distinct   procedures.    After the extensive decompression was achieved, I gently mobilized left S1   nerve root medially exposing the disk herniation.  I incised the disk and   removed several large disk  fragments.  Thecal sac and nerve roots were nice   and freed up at this stage throughout.  Wound was irrigated with bacitracin   irrigation.  Immaculate hemostasis achieved.  I infiltrated the muscle with   Marcaine analgesia.  I then placed an epidural catheter with Marcaine and   fentanyl for postoperative analgesia.  I then closed up the wound with 0   Vicryl to deep fascia, 2-0 Vicryl to deep dermal layer, Steri-Strips to skin.    Small sterile dressing was applied.  Swabs, needles, instruments correct by 2   count.  No complications were encountered.  Patient tolerated procedure and   was stable and transferred to recovery room.  Patient will be observed at   [facility] as an outpatient today until he meets discharge   criteria.  Patient will then follow up at [facility] in 2 weeks' and 6 weeks' time as arranged.    INTRAOPERATIVE FINDINGS:  Severe stenosis on left side at L5-S1 with collapse   and foraminal stenosis and superimposed large left L5-S1 paracentral and   lateral disk herniation.  This was all freed up as extensively as described.    No complications were encountered.  Patient was neurologically intact in   recovery room and will be discharged within a few hours of surgery.       ____________________________________     MD JOHNNY DEAN / ROSITA    DD:  05/28/2018 16:56:38  DT:  05/28/2018 17:28:09    D#:  [id number]  Job#:  [id number]    cc: CATHEY HAMMAN APN, Jose Maria Otero MD, Edith Love

## 2021-03-15 DIAGNOSIS — Z23 NEED FOR VACCINATION: ICD-10-CM

## (undated) DEVICE — SPONGE GAUZESTER 4 X 4 4PLY - (128PK/CA)

## (undated) DEVICE — CLOSURE SKIN STRIP 1/2 X 4 IN - (STERI STRIP) (50/BX 4BX/CA)

## (undated) DEVICE — INTRAOP NEURO IN OR 1:1 PER 15 MIN

## (undated) DEVICE — NEEDLE NON-SAFETY HYPO 21 GA X 1 1/2 IN HYPO (100/BX)

## (undated) DEVICE — STERI STRIP COMPOUND BENZOIN - TINCTURE 0.6ML WITH APPLICATOR (40EA/BX)

## (undated) DEVICE — FIBRILLAR SURGICEL 4X4 - 10/CA

## (undated) DEVICE — PACK JACKSON TABLE KIT W/OUT - HR (6EA/CA)

## (undated) DEVICE — SYRINGE 30 ML LL (56/BX)

## (undated) DEVICE — SET EXTENSION WITH 2 PORTS (48EA/CA) ***PART #2C8610 IS A SUBSTITUTE*****

## (undated) DEVICE — LACTATED RINGERS INJ 1000 ML - (14EA/CA 60CA/PF)

## (undated) DEVICE — TOOL DISSECT MATCH HEAD

## (undated) DEVICE — GOWN SURGICAL XX-LARGE - (28EA/CA) SIRUS NON REINFORCED

## (undated) DEVICE — PROTECTOR ULNA NERVE - (36PR/CA)

## (undated) DEVICE — SLEEVE, VASO, THIGH, MED

## (undated) DEVICE — TUBE E-T HI-LO CUFF 8.0MM (10EA/PK)

## (undated) DEVICE — GLOVE BIOGEL INDICATOR SZ 7SURGICAL PF LTX - (50/BX 4BX/CA)

## (undated) DEVICE — GLOVE BIOGEL ECLIPSE PF LATEX SIZE 8.5 (50PR/BX)

## (undated) DEVICE — PACK NEURO - (2EA/CA)

## (undated) DEVICE — CATHETER EPIDURAL PORTEX (10/BX) ***DISCONTINUED LOOKING INTO SUB***

## (undated) DEVICE — SUTURE 1 VICRYL PLUS CT-1 - 18 INCH (12/BX)

## (undated) DEVICE — KIT ROOM DECONTAMINATION

## (undated) DEVICE — GLOVE BIOGEL PI INDICATOR SZ 8.0 SURGICAL PF LF -(50/BX 4BX/CA)

## (undated) DEVICE — GLOVE BIOGEL PI ORTHO SZ 6 1/2 SURGICAL PF LF (40PR/BX)

## (undated) DEVICE — LIGHT SOURCE MIS 12FT

## (undated) DEVICE — ELECTRODE DUAL RETURN W/ CORD - (50/PK)

## (undated) DEVICE — SET LEADWIRE 5 LEAD BEDSIDE DISPOSABLE ECG (1SET OF 5/EA)

## (undated) DEVICE — GOWN SURGEONS X-LARGE - DISP. (30/CA)

## (undated) DEVICE — HEADREST PRONEVIEW LARGE - (10/CA)

## (undated) DEVICE — SUCTION INSTRUMENT YANKAUER BULBOUS TIP W/O VENT (50EA/CA)

## (undated) DEVICE — MASK ANESTHESIA ADULT  - (100/CA)

## (undated) DEVICE — TUBING C&T SET FLYING LEADS DRAIN TUBING (10EA/BX)

## (undated) DEVICE — SUTURE 2-0 VICRYL PLUS CT-1 - 8 X 18 INCH(12/BX)

## (undated) DEVICE — KIT SURGIFLO W/OUT THROMBIN - (6EA/CA)

## (undated) DEVICE — GLOVE BIOGEL ECLIPSE PF LATEX SIZE 8.0  (50PR/BX)

## (undated) DEVICE — SUTURE GENERAL

## (undated) DEVICE — CHLORAPREP 26 ML APPLICATOR - ORANGE TINT(25/CA)

## (undated) DEVICE — KIT ANESTHESIA W/CIRCUIT & 3/LT BAG W/FILTER (20EA/CA)

## (undated) DEVICE — CANISTER SUCTION 3000ML MECHANICAL FILTER AUTO SHUTOFF MEDI-VAC NONSTERILE LF DISP  (40EA/CA)

## (undated) DEVICE — SYRINGE SAFETY 10 ML 18 GA X 1 1/2 BLUNT LL (100/BX 4BX/CA)

## (undated) DEVICE — GLOVE BIOGEL PI ORTHO SZ 7.5 PF LF (40PR/BX)

## (undated) DEVICE — SENSOR SPO2 NEO LNCS ADHESIVE (20/BX) SEE USER NOTES

## (undated) DEVICE — SODIUM CHL IRRIGATION 0.9% 1000ML (12EA/CA)

## (undated) DEVICE — GLOVE BIOGEL SZ 6.5 SURGICAL PF LTX (50PR/BX 4BX/CA)

## (undated) DEVICE — NEPTUNE 4 PORT MANIFOLD - (20/PK)

## (undated) DEVICE — ELECTRODE 850 FOAM ADHESIVE - HYDROGEL RADIOTRNSPRNT (50/PK)

## (undated) DEVICE — ARMREST CRADLE FOAM - (2PR/PK 12PR/CA)

## (undated) DEVICE — LACTATED RINGERS INJ. 500 ML - (24EA/CA)

## (undated) DEVICE — MIDAS LUBRICATOR DIFFUSER PACK (4EA/CA)

## (undated) DEVICE — TUBING CLEARLINK DUO-VENT - C-FLO (48EA/CA)

## (undated) DEVICE — DRAPE LAPAROTOMY T SHEET - (12EA/CA)

## (undated) DEVICE — GOWN WARMING STANDARD FLEX - (30/CA)